# Patient Record
Sex: FEMALE | Race: WHITE
[De-identification: names, ages, dates, MRNs, and addresses within clinical notes are randomized per-mention and may not be internally consistent; named-entity substitution may affect disease eponyms.]

---

## 2018-04-30 ENCOUNTER — HOSPITAL ENCOUNTER (EMERGENCY)
Dept: HOSPITAL 52 - LL.ED | Age: 48
Discharge: HOME | End: 2018-04-30
Payer: COMMERCIAL

## 2018-04-30 DIAGNOSIS — M35.00: ICD-10-CM

## 2018-04-30 DIAGNOSIS — E88.09: ICD-10-CM

## 2018-04-30 DIAGNOSIS — D72.819: Primary | ICD-10-CM

## 2018-04-30 DIAGNOSIS — J44.9: ICD-10-CM

## 2018-04-30 DIAGNOSIS — Z88.2: ICD-10-CM

## 2018-04-30 DIAGNOSIS — Z79.899: ICD-10-CM

## 2018-04-30 DIAGNOSIS — K21.9: ICD-10-CM

## 2018-04-30 DIAGNOSIS — F41.8: ICD-10-CM

## 2018-04-30 LAB
CHLORIDE SERPL-SCNC: 100 MMOL/L (ref 98–107)
SODIUM SERPL-SCNC: 136 MMOL/L (ref 136–145)

## 2018-04-30 NOTE — EDM.PDOC
ED HPI GENERAL MEDICAL PROBLEM





- General


Chief Complaint: General


Stated Complaint: chills, fever, immuno suppressed


Time Seen by Provider: 18 17:20


Source of Information: Reports: Patient, Old Records ([facility] chart/EMR)


History Limitations: Reports: No Limitations





- History of Present Illness


INITIAL COMMENTS - FREE TEXT/NARRATIVE: 





The patient drove herself to the emergency room via private automobile for 

evaluation of nonspecific fever and chills with symptoms starting at about 11:

30 a.m. this morning. Note that the patient did have a fever of 100.2 at about 

15:30 hours this afternoon with to OTC Aleve taken at that time. Patient did 

have her routine blood work this morning, which did show some significant 

leukopenia with WBCs of only 1.9 at that time. Her hematologist recommended 

that she come to the emergency room for further evaluation to rule out any 

possible current infection. The patient denies any chest pain/pressure, heart 

flutter, dizziness, orthostasis, orthopnea, diaphoresis, paresthesias, recent 

decreased exercise tolerance, or any other anginal-type symptoms. No recent 

history of abdominal pain, heartburn, nausea, diarrhea, melena, gross 

hematochezia, or any food intolerance, including fatty foods, etc.. She denies 

any current gross hematuria, dysuria, colic, or other UTI symptoms. The patient 

also denies any recent cough, wheezing, dyspnea, etc.. No recent history of 

known exposure to infection. No history of recent visual changes, diplopia, 

change in mental status, or other change in neurological status, although 

nonspecific 6/10 bilateral frontal headache at this time similar to previous 

episodes. 


Onset: Today, Gradual


Onset Date: 18


Onset Time: 11:30


Duration: Other (No pain)


Quality: Reports: Ache, Same as Previous Episode


Severity: Mild


Improves with: Reports: None


Worsens with: Reports: None


Context: Reports: Other (As above)


Associated Symptoms: Reports: Fever/Chills.  Denies: Confusion, Chest Pain, 

Cough, Diaphoresis, Headaches, Loss of Appetite, Malaise, Nausea/Vomiting, Rash

, Shortness of Breath, Syncope, Weakness


Treatments PTA: Reports: NSAIDS (As above)


  ** Headache


Pain Score (Numeric/FACES): 6





- Related Data


 Allergies











Allergy/AdvReac Type Severity Reaction Status Date / Time


 


Sulfa (Sulfonamide Allergy  Other Verified 18 17:14





Antibiotics)     











Home Meds: 


 Home Meds





Cholecalciferol (Vitamin D3) [Vitamin D3] 400 unit PO DAILY 18 [History]


Citalopram [Citalopram HBr] 20 mg PO DAILY 18 [History]


Ethinyl Estradiol/Drospirenone [[place] 3 MG-0.03 MG] 1 each PO DAILY 18 [

History]


Fish Oil/DHA/EPA [Fish Oil 1,200 MG] 2,400 mg PO DAILY 18 [History]


Vitamin B Complex 1 each PO DAILY 18 [History]


Tbo-Filgrastim [Granix] 300 mcg SUBCUT ASDIRECTED 18 [History]











Past Medical History


HEENT History: Reports: Impaired Vision, Other (See Below).  Denies: Allergic 

Rhinitis, Cataract, Glaucoma, Hard of Hearing, Macular Degeneration, Retinal 

Detachment


Other HEENT History: Patient wears glasses. Dry eye syndrome from Sjogren's 

syndrome.


Cardiovascular History: Reports: None.  Denies: Afib, Aneurysm, Arrhythmia, 

Blood Clots/VTE/DVT, CAD, Cardiomyopathy, Heart Murmur, High Cholesterol, 

Hypertension, MI, Syncope


Respiratory History: Reports: COPD, Other (See Below).  Denies: Intubation, 

Previous, PE, Sleep Apnea


Other Respiratory History: COPD by chest x-ray


Gastrointestinal History: Reports: GERD, Other (See Below).  Denies: Celiac 

Disease, Cholelithiasis, Chronic Constipation, Chronic Diarrhea, Colon Polyp, 

Fecal Incontinence, Gastritis, GI Bleed, Hepatitis, Irritable Bowel Syndrome, 

Jaundice, Pancreatitis, PUD


Other Gastrointestinal History: History of fungal distal esophagitis in 2018 requiring hospitalization. Sjogren's syndrome.


Genitourinary History: Reports: None.  Denies: Acute Renal Failure, Chronic 

Renal Insuffiency, Renal Calculus, STD, Urinary Incontinence, UTI, Recurrent


OB/GYN History: Reports: Pregnancy, Spontaneous , Other (See Below).  

Denies: Dysfunctional Uterine Bleeding, Endometriosis


: 3


Para: 2


LMP (Approximate): 2 Weeks


Other OB/BYN History: First trimester SAB without procedures required. Otherwise

, Full term  without complications during pregnancies or deliveries


Musculoskeletal History: Reports: Arthritis, Back Pain, Chronic, Fracture, 

Osteoarthritis, Other (See Below).  Denies: Gout, RA, SLE


Other Musculoskeletal History: Sjogren's syndrome. Right wrist fracture in 

. Either third or fourth right metatarsal fracture in .


Neurological History: Reports: Headaches, Chronic.  Denies: Cerebral Aneurysms, 

Concussion, CVA, Head Trauma, Migraines, MS, Parkinson's, Seizure, TIA


Psychiatric History: Reports: Anxiety, Depression.  Denies: Abuse, Victim of, 

ADD, ADHD, Addiction, Psych Hospitalization(s), Psychosis, PTSD, Suicide Attempt

, Suicidal Ideation


Endocrine/Metabolic History: Reports: None.  Denies: Diabetes, Gestational, 

Diabetes, Type I, Diabetes, Type II, Diabetes Mellitus, Type 3c, Hypothyroidism

, IDDM, Multinodular Thyroid


Hematologic History: Reports: Other (See Below).  Denies: Anemia, Blood 

Transfusion(s), Iron Deficiency


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 

etiology


Immunologic History: Reports: Immunosuppression, Other (See Below).  Denies: 

AIDS, HIV, SLE


Other Immunologic History: Chronic leukopenia as above


Oncologic (Cancer) History: Denies: Basal Cell Carcinoma, Breast, Cervix, Colon

, Hodgkin's Lymphoma, Leukemia, Lymphoma, Malignant Melanoma, Non-Hodgkin's 

Lymphoma, Squamous Cell Carcinoma


Dermatologic History: Reports: None.  Denies: Eczema, Melanoma





- Infectious Disease History


Infectious Disease History: Reports: Chicken Pox.  Denies: C-Difficile, Measles

, Meningitis, Mononucleosis, MRSA, Mumps, Pertussis (Whooping Cough), Rheumatic 

Fever, Rubella, Scarlet Fever, Shingles, TB, VRE





- Past Surgical History


Head Surgeries/Procedures: Reports: None


HEENT Surgical History: Reports: Oral Surgery, Other (See Below).  Denies: 

Adenoidectomy, Cataract Surgery, Eye Surgery, Laser Surgery, LASIK, Myringotomy 

w Tube(s), Naso-Sinus Surgery, Polypectomy, Tonsillectomy


Other HEENT Surgeries/Procedures: [place] teeth extraction 4 in 


Cardiovascular Surgical History: Reports: None.  Denies: Varicose


Respiratory Surgical History: Reports: None.  Denies: Thoracentesis


GI Surgical History: Reports: Appendectomy, EGD, Other (See Below).  Denies: 

Cholecystectomy, Colonoscopy, Hernia, Abdominal, Hernia, Inguinal, Hernia Repair

/Other, Polypectomy


Other GI Surgeries/Procedures: Appendectomy at age 15. EGD in 2018.


Female  Surgical History: Reports: Other (See Below).  Denies: Breast Biopsy, 

 Section, D&C, Hysterectomy, Salpingo-Oophorectomy, Tubal Ligation


Other Female  Surgeries/Procedures: Left Renal kidney donation in 


Endocrine Surgical History: Reports: None.  Denies: Thyroid Biopsy


Neurological Surgical History: Reports: None.  Denies: C-Spine, Discectomy, 

Laminectomy, Lumbar Spine, Sacral Spine, Spinal Fusion, Vertebroplasty


Musculoskeletal Surgical History: Reports: None.  Denies: Arthroscopic Procedure

, Carpal Tunnel, Ganglion Cyst, Joint Replacement, ORIF, Shoulder Surgery


Oncologic Surgical History: Reports: Bone Marrow Aspiration, Other (See Below)


Other Oncologic Surgeries/Procedures: Bone marrow needle aspiration biopsy in 

2018 for workup of her leukopenia


Dermatological Surgical History: Reports: None





- Past Imaging History


Past Imaging History: Reports: CAT Scan (CT of the chest in 2018)





Social & Family History





- Tobacco Use


Smoking Status *Q: Never Smoker


Tobacco Use Within Last Twelve Months: No


Used Tobacco, but Quit: No


Smoking Cessation Information Provided To Patient: No


Second Hand Smoke Exposure: No


Second Hand Smoke Education Provided: No





- Caffeine Use


Caffeine Use: Reports: Coffee (20 ounces per day).  Denies: Energy Drinks, Soda

, Tea





- Alcohol Use


Alcohol Use History: Yes


Days Per Week of Alcohol Use: 1 (No previous DWIs, problems with alcohol abuse, 

etc.)


Number of Drinks Per Day: 2 (Usually wine)


Total Drinks Per Week: 2


Alcohol Use in Last Twelve Months: Yes


Alcohol Use Frequency: Weekly





- Recreational Drug Use


Recreational Drug Use: No


Drug Use in Last 12 Months: No


Recreational Drug Type: Denies: Amphetamines (Speed), Cocaine, Heroin, 

Inhalants (Glues, Solvents, Aerosols), LSD (Acid), Marijuana/Hashish, 

Methamphetamine, Morphine, Oxycodone





- Living Situation & Occupation


Living situation: Reports:  (2000, 2 children), with Family


Occupation: Employed ()





ED ROS GENERAL





- Review of Systems


Review Of Systems: ROS reveals no pertinent complaints other than HPI.





ED EXAM, GENERAL





- Physical Exam


Exam: See Below


Exam Limited By: No Limitations


General Appearance: Alert, WD/WN, No Apparent Distress


Eye Exam: Bilateral Eye: EOMI, Normal Inspection, PERRL


Ears: Normal External Exam, Normal Canal, Hearing Grossly Normal, Normal TMs


Nose: Normal Inspection, Normal Mucosa, No Blood


Throat/Mouth: Normal Inspection, Normal Lips, Normal Teeth, Normal Gums, Normal 

Oropharynx, Normal Voice, No Airway Compromise.  No: Dysphagia, Inflammation, 

Perioral Cyanosis


Head: Atraumatic, Normocephalic.  No: Facial Swelling, Facial Tenderness, Sinus 

Tenderness


Neck: Normal Inspection, Supple, Non-Tender, Full Range of Motion.  No: 

Lymphadenopathy (L), Lymphadenopathy (R), Thyromegaly


Respiratory/Chest: No Respiratory Distress, Lungs Clear, Normal Breath Sounds, 

No Accessory Muscle Use, Chest Non-Tender.  No: Pleural Rub, Retractions


Cardiovascular: Normal Peripheral Pulses, Regular Rate, Rhythm, No Edema, No 

Gallop, No JVD, No Murmur, No Rub.  No: Gallop/S3, Gallop/S4, Friction Rub


Peripheral Pulses: 2+: Radial (L), Radial (R)


GI/Abdominal: Normal Bowel Sounds, Soft, Non-Tender, No Organomegaly, No 

Distention, No Abnormal Bruit, No Mass, Pelvis Stable.  No: Guarding


 (Female) Exam: Deferred


Rectal (Female) Exam: Deferred


Back Exam: Normal Inspection, Full Range of Motion.  No: CVA Tenderness (L), 

CVA Tenderness (R), Muscle Spasm


Extremities: Normal Inspection, Normal Range of Motion, Non-Tender, No Pedal 

Edema, Normal Capillary Refill.  No: Makayla's Sign


Neurological: Alert, Oriented, CN II-XII Intact, Normal Cognition, Normal Gait, 

No Motor/Sensory Deficits, Other (No clinical orthostasis)


Psychiatric: Normal Affect, Normal Mood


Skin Exam: Warm, Dry, Intact, Normal Color, No Rash.  No: Diaphoretic, 

Ecchymosis, Lymphangitis, Petechiae, Wound/Incision


Lymphatic: No Adenopathy





Course





- Vital Signs


Last Recorded V/S: 


 Last Vital Signs











Temp  36.8 C   18 17:14


 


Pulse  86   18 17:14


 


Resp  20   18 17:14


 


BP  103/64   18 17:14


 


Pulse Ox  97   18 17:14











 Vital Signs - 24 hr











  18





  17:14


 


Temperature [ 36.8 C





Oral] 


 


Pulse, 86





Peripheral [ 





Brachial] 


 


Respiratory 20





Rate 


 


Blood Pressure 103/64





[Upper Arm] 


 


O2 Sat by Pulse 97





Oximetry 














- Orders/Labs/Meds


Orders: 


 Active Orders 24 hr











 Category Date Time Status


 


 Chest 2V [CR] Urgent Exams  18 17:33 Ordered


 


 CULTURE BLOOD [BC] Stat Lab  18 17:35 Ordered


 


 CULTURE BLOOD [BC] Stat Lab  18 17:45 Received


 


 CULTURE STREP A CONFIRMATION [RM] Stat Lab  18 18:00 Results


 


 CULTURE URINE [RM] Routine Lab  18 17:33 Ordered


 


 STREP SCRN A RAPID W CULT CONF [RM] Stat Lab  18 17:35 Ordered


 


 Blood Culture x2 Reflex Set [OM.PC] Urgent Oth  18 17:33 Ordered


 


 Obtain Past Medical Record [OM.PC] Routine Oth  18 17:35 Active











Labs: 


 Laboratory Tests











  18 Range/Units





  17:45 17:45 18:07 


 


Sodium  136    (136-145)  mmol/L


 


Potassium  4.1    (3.5-5.1)  mmol/L


 


Chloride  100    ()  mmol/L


 


Carbon Dioxide  26.6    (21.0-32.0)  mmol/L


 


BUN  10    (7-18)  mg/dL


 


Creatinine  0.72    (0.51-1.17)  mg/dL


 


Est Cr Clr Drug Dosing  87.85    mL/min


 


Estimated GFR (MDRD)  > 60    mL/min


 


Glucose  109 H    ()  mg/dL


 


Lactic Acid   1.5   (0.4-2.0)  mmol/L


 


Calcium  9.1    (8.5-10.1)  mg/dL


 


Total Bilirubin  0.2    (0.2-1.0)  mg/dL


 


AST  35    (15-37)  U/L


 


ALT  30    (12-78)  U/L


 


Alkaline Phosphatase  83    ()  IU/L


 


Total Protein  8.4 H    (6.4-8.2)  g/dL


 


Albumin  3.1 L    (3.4-5.0)  g/dL


 


Specimen Type    Urincc  


 


Urine Color    Yellow  


 


Urine Appearance    Clear  


 


Urine pH    7.0  (5.0-9.0)  


 


Ur Specific Gravity    1.010  (1.005-1.030)  


 


Urine Protein    Negative  (NEGATIVE)  mg/dL


 


Urine Glucose (UA)    Negative  (NEGATIVE)  mg/dL


 


Urine Ketones    Negative  (NEGATIVE)  mg/dL


 


Urine Occult Blood    Negative  (NEGATIVE)  


 


Urine Nitrite    Negative  (NEGATIVE)  


 


Urine Bilirubin    Negative  (NEGATIVE)  


 


Urine Urobilinogen    1.0  (0.2-1.0)  E.U./dL


 


Ur Leukocyte Esterase    Negative  (NEGATIVE)  


 


Urine RBC    0-5  /HPF


 


Urine WBC    0-5  /HPF


 


Ur Epithelial Cells    Few  /LPF


 


Urine Bacteria    Few  (NONE TO FEW)  /HPF








CBC earlier this morning showed WBCs of 1.9 with hemoglobin of 12.1, MCV 88.5, 

platelets 253, elevated RDW of 15.9, decreased neutrophils of 5.7% and 

increased lymphocytes of 69.8%





Blood cultures 2 and urine specimens collected for culture and sensitivity








 Microbiology











 18 18:00 Group A Streptococcus Rapid Screen - Final





 Throat    NEGATIVE STREP A SCREEN


 


 18 18:00 Influenza Type A Antigen Screen - Final





 Nasal, Left    NEGATIVE INFLUENZA A VIRUS AG





 Influenza Type B Antigen Screen - Final





    NEGATIVE INFLUENZA B VIRUS AG











Meds: 


None





- Radiology Interpretation


Free Text/Narrative:: 





Chest x-ray, PA and lateral, shows mild COPD changes with no pulmonary 

infiltrates, pneumothorax, etc. Mild osteoarthritic changes noted. Somewhat 

small and hypoplastic heart.





Departure





- Departure


Time of Disposition: 18:45


Disposition: Home, Self-Care 01


Condition: Good


Clinical Impression: 


 Leukopenia, Peptic reflux disease, COPD (chronic obstructive pulmonary disease)

, Mixed anxiety depressive disorder, Sjogrens syndrome, Hypoalbuminemia








- Discharge Information


Referrals: 


Jessica Dan MD [Primary Care Provider] - 


Forms:  ED Department Discharge


Additional Instructions: 


1.  Follow up with your regular provider in 7-10 days as needed, if symptoms 

persist.


2.  Hygiene precautions and avoidance infections as previously recommended


3.  Otherwise follow-up with your hematologist as already scheduled





- Problem List & Annotations


(1) Leukopenia


SNOMED Code(s): 57822783, 754017198


   Code(s): D72.819 - DECREASED WHITE BLOOD CELL COUNT, UNSPECIFIED   Status: 

Acute   Priority: High   Onset Date: 18   Annotation/Comment:: Mild low-

grade fever today, improved from earlier Aleve therapy as above. Likely mild 

beginning URI with very borderline sinus headaches. Observe for now. Continue 

close follow-up by her hematologist and regular provider. Her Granix injection 

is already ordered and scheduled in this facility for tomorrow. Hygiene issues, 

etc. discussed. She is not currently working secondary to her leukopenia.   


Qualifiers: 


   Leukopenia type: neutropenia   Neutropenia type: other   Qualified Code(s): 

D70.8 - Other neutropenia   





(2) COPD (chronic obstructive pulmonary disease)


SNOMED Code(s): 15355364


   Code(s): J44.9 - CHRONIC OBSTRUCTIVE PULMONARY DISEASE, UNSPECIFIED   Status

: Chronic   Priority: Medium   Annotation/Comment:: Stable by history with no 

significant bronchitic type symptoms despite low-grade fever at this time. Note 

chest x-ray results as above.   


Qualifiers: 


   COPD type: emphysema   Emphysema type: panlobular   Qualified Code(s): J43.1 

- Panlobular emphysema   





(3) Mixed anxiety depressive disorder


SNOMED Code(s): 639580852


   Code(s): F41.8 - OTHER SPECIFIED ANXIETY DISORDERS   Status: Chronic   

Priority: Medium   Annotation/Comment:: Stable by history   





(4) Peptic reflux disease


SNOMED Code(s): 798001762


   Code(s): K21.9 - GASTRO-ESOPHAGEAL REFLUX DISEASE WITHOUT ESOPHAGITIS   

Status: Chronic   Priority: Medium   Annotation/Comment:: Stable by history   





(5) Hypoalbuminemia


SNOMED Code(s): 937284899


   Code(s): E88.09 - OTH DISORDERS OF PLASMA-PROTEIN METABOLISM, NEC   Status: 

Acute   Priority: Medium   Onset Date: 18   Annotation/Comment:: Observe 

for now. Consider high-protein Glucerna supplements.   





(6) Sjogrens syndrome


SNOMED Code(s): 79158945


   Code(s): M35.00 - SICCA SYNDROME, UNSPECIFIED   Status: Chronic   Priority: 

Medium   Annotation/Comment:: Stable by patient history   


Qualifiers: 


   Sjogren's organ involvement: unspecified organ involvement   Qualified Code(s

): M35.00 - Sicca syndrome, unspecified   





- Problem List Review


Problem List Initiated/Reviewed/Updated: Yes





- My Orders


Last 24 Hours: 


My Active Orders





18 17:33


Chest 2V [CR] Urgent 


CULTURE URINE [RM] Routine 


Blood Culture x2 Reflex Set [OM.PC] Urgent 





18 17:35


CULTURE BLOOD [BC] Stat 


STREP SCRN A RAPID W CULT CONF [RM] Stat 


Obtain Past Medical Record [OM.PC] Routine 





18 17:45


CULTURE BLOOD [BC] Stat 





04/30/18 18:00


CULTURE STREP A CONFIRMATION [RM] Stat 














- Assessment/Plan


Last 24 Hours: 


My Active Orders





18 17:33


Chest 2V [CR] Urgent 


CULTURE URINE [RM] Routine 


Blood Culture x2 Reflex Set [OM.PC] Urgent 





18 17:35


CULTURE BLOOD [BC] Stat 


STREP SCRN A RAPID W CULT CONF [RM] Stat 


Obtain Past Medical Record [OM.PC] Routine 





18 17:45


CULTURE BLOOD [BC] Stat 





18 18:00


CULTURE STREP A CONFIRMATION [RM] Stat 











Assessment:: 





As above


Plan: 





As above. Extensive precautions were given to the patient, who is in agreement 

with the treatment plan. See Patient Instructions for further treatment and 

plan.

## 2024-09-19 ENCOUNTER — HOSPITAL ENCOUNTER (EMERGENCY)
Dept: HOSPITAL 52 - LL.ED | Age: 54
Discharge: HOME | End: 2024-09-19
Payer: COMMERCIAL

## 2024-09-19 DIAGNOSIS — J44.9: ICD-10-CM

## 2024-09-19 DIAGNOSIS — S01.81XA: Primary | ICD-10-CM
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
## 2024-09-19 DIAGNOSIS — Z79.899: ICD-10-CM

## 2024-09-19 DIAGNOSIS — Z88.2: ICD-10-CM
